# Patient Record
Sex: FEMALE | Race: WHITE | NOT HISPANIC OR LATINO | URBAN - NONMETROPOLITAN AREA
[De-identification: names, ages, dates, MRNs, and addresses within clinical notes are randomized per-mention and may not be internally consistent; named-entity substitution may affect disease eponyms.]

---

## 2024-11-26 ENCOUNTER — OFFICE VISIT (OUTPATIENT)
Dept: URBAN - NONMETROPOLITAN AREA CLINIC 4 | Facility: CLINIC | Age: 29
End: 2024-11-26
Payer: COMMERCIAL

## 2024-11-26 ENCOUNTER — DASHBOARD ENCOUNTERS (OUTPATIENT)
Age: 29
End: 2024-11-26

## 2024-11-26 ENCOUNTER — LAB OUTSIDE AN ENCOUNTER (OUTPATIENT)
Dept: URBAN - NONMETROPOLITAN AREA CLINIC 4 | Facility: CLINIC | Age: 29
End: 2024-11-26

## 2024-11-26 VITALS
DIASTOLIC BLOOD PRESSURE: 124 MMHG | HEIGHT: 64 IN | SYSTOLIC BLOOD PRESSURE: 187 MMHG | WEIGHT: 188.8 LBS | HEART RATE: 87 BPM | TEMPERATURE: 97.3 F | BODY MASS INDEX: 32.23 KG/M2

## 2024-11-26 DIAGNOSIS — R63.4 WEIGHT LOSS, UNINTENTIONAL: ICD-10-CM

## 2024-11-26 DIAGNOSIS — R10.12 LUQ PAIN: ICD-10-CM

## 2024-11-26 PROCEDURE — 99245 OFF/OP CONSLTJ NEW/EST HI 55: CPT | Performed by: PHYSICIAN ASSISTANT

## 2024-11-26 RX ORDER — DOXYCYCLINE 40 MG/1
1 CAPSULE IN THE MORNING ON AN EMPTY STOMACH CAPSULE ORAL ONCE A DAY
Status: ACTIVE | COMMUNITY

## 2024-11-26 NOTE — PHYSICAL EXAM RECTAL:
normal tone, no external hemorrhoids, no masses palpable, no red blood, Tenderness on ANNE, Internal hemorrhoids present

## 2024-11-26 NOTE — HPI-TODAY'S VISIT:
Pat is 28 years old she has medical history of skin condition requiring doxycycline who is currently here for new onset of left upper quadrant abdominal pain as well as change in bowel habits. Starting in the beginning of October she noted some bands like abdominal discomfort, and change in bowel habits with skinny stools.   She's a  and went to go see her primary care physician who ultimately told her to do a bowel cleanse over a weekend which did result in significant stooling and some improvement of her left upper quadrant and band like abdominal pain. Unfortunately the pain represented itself and she went for a non contrast CT scan which revealed no acute findings.   She's here today as she continues to have the discomfort in the left upper quadrant, and her stools are inconsistent, and she is also unintentionally lost approximately 20 pounds. There's no rectal bleeding, there's no black stool, she does have no family history of colon cancer but does have a history of non Hodgkin's lymphoma diagnosed in the father who is currently in remission

## 2024-12-03 ENCOUNTER — WEB ENCOUNTER (OUTPATIENT)
Dept: URBAN - NONMETROPOLITAN AREA CLINIC 4 | Facility: CLINIC | Age: 29
End: 2024-12-03

## 2024-12-03 ENCOUNTER — TELEPHONE ENCOUNTER (OUTPATIENT)
Dept: URBAN - NONMETROPOLITAN AREA CLINIC 4 | Facility: CLINIC | Age: 29
End: 2024-12-03

## 2024-12-23 ENCOUNTER — OFFICE VISIT (OUTPATIENT)
Dept: URBAN - METROPOLITAN AREA SURGERY CENTER 14 | Facility: SURGERY CENTER | Age: 29
End: 2024-12-23

## 2024-12-23 RX ORDER — DOXYCYCLINE 40 MG/1
1 CAPSULE IN THE MORNING ON AN EMPTY STOMACH CAPSULE ORAL ONCE A DAY
Status: ACTIVE | COMMUNITY

## 2025-01-07 ENCOUNTER — WEB ENCOUNTER (OUTPATIENT)
Dept: URBAN - NONMETROPOLITAN AREA CLINIC 4 | Facility: CLINIC | Age: 30
End: 2025-01-07

## 2025-01-23 ENCOUNTER — OFFICE VISIT (OUTPATIENT)
Dept: URBAN - NONMETROPOLITAN AREA CLINIC 4 | Facility: CLINIC | Age: 30
End: 2025-01-23

## 2025-01-28 ENCOUNTER — OFFICE VISIT (OUTPATIENT)
Dept: URBAN - NONMETROPOLITAN AREA CLINIC 4 | Facility: CLINIC | Age: 30
End: 2025-01-28
Payer: COMMERCIAL

## 2025-01-28 VITALS
HEART RATE: 91 BPM | TEMPERATURE: 97.2 F | WEIGHT: 194 LBS | BODY MASS INDEX: 33.12 KG/M2 | SYSTOLIC BLOOD PRESSURE: 145 MMHG | DIASTOLIC BLOOD PRESSURE: 90 MMHG | HEIGHT: 64 IN

## 2025-01-28 DIAGNOSIS — R10.12 LUQ PAIN: ICD-10-CM

## 2025-01-28 DIAGNOSIS — R63.4 WEIGHT LOSS, UNINTENTIONAL: ICD-10-CM

## 2025-01-28 PROCEDURE — 99214 OFFICE O/P EST MOD 30 MIN: CPT | Performed by: PHYSICIAN ASSISTANT

## 2025-01-28 RX ORDER — DOXYCYCLINE 40 MG/1
1 CAPSULE IN THE MORNING ON AN EMPTY STOMACH CAPSULE ORAL ONCE A DAY
Status: DISCONTINUED | COMMUNITY

## 2025-01-28 NOTE — HPI-TODAY'S VISIT:
Pat is 28 years old she has medical history of skin condition requiring doxycycline who is currently here for new onset of left upper quadrant abdominal pain as well as change in bowel habits. Starting in the beginning of October she noted some bands like abdominal discomfort, and change in bowel habits with skinny stools.   She's a  and went to go see her primary care physician who ultimately told her to do a bowel cleanse over a weekend which did result in significant stooling and some improvement of her left upper quadrant and band like abdominal pain. Unfortunately the pain represented itself and she went for a non contrast CT scan which revealed no acute findings.   She's here today as she continues to have the discomfort in the left upper quadrant, and her stools are inconsistent, and she is also unintentionally lost approximately 20 pounds. There's no rectal bleeding, there's no black stool, she does have no family history of colon cancer but does have a history of non Hodgkin's lymphoma diagnosed in the father who is currently in remission  1.28.25 EGD with mild villous blunting of duo noted cscope normal  Reassured patient that ct scan of abdomen and pelvis did not reveal any evidence of lymphoma or etiology to explain her abdominal discomfort. 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

## 2025-02-10 ENCOUNTER — OFFICE VISIT (OUTPATIENT)
Dept: URBAN - METROPOLITAN AREA SURGERY CENTER 14 | Facility: SURGERY CENTER | Age: 30
End: 2025-02-10

## 2025-03-07 ENCOUNTER — OFFICE VISIT (OUTPATIENT)
Dept: URBAN - NONMETROPOLITAN AREA CLINIC 4 | Facility: CLINIC | Age: 30
End: 2025-03-07